# Patient Record
Sex: MALE
[De-identification: names, ages, dates, MRNs, and addresses within clinical notes are randomized per-mention and may not be internally consistent; named-entity substitution may affect disease eponyms.]

---

## 2023-07-05 ENCOUNTER — APPOINTMENT (OUTPATIENT)
Dept: PODIATRY | Facility: CLINIC | Age: 63
End: 2023-07-05
Payer: COMMERCIAL

## 2023-07-05 DIAGNOSIS — M76.71 PERONEAL TENDINITIS, RIGHT LEG: ICD-10-CM

## 2023-07-05 DIAGNOSIS — Z83.3 FAMILY HISTORY OF DIABETES MELLITUS: ICD-10-CM

## 2023-07-05 DIAGNOSIS — M79.671 PAIN IN RIGHT FOOT: ICD-10-CM

## 2023-07-05 PROCEDURE — 73630 X-RAY EXAM OF FOOT: CPT | Mod: RT

## 2023-07-05 PROCEDURE — 99203 OFFICE O/P NEW LOW 30 MIN: CPT | Mod: 25

## 2023-07-05 RX ORDER — MELOXICAM 15 MG/1
15 TABLET ORAL DAILY
Qty: 14 | Refills: 0 | Status: ACTIVE | COMMUNITY
Start: 2023-07-05 | End: 1900-01-01

## 2023-07-07 PROBLEM — M79.671 RIGHT FOOT PAIN: Status: ACTIVE | Noted: 2023-07-06

## 2023-07-07 PROBLEM — M76.71 PERONEAL TENDINITIS OF RIGHT LOWER EXTREMITY: Status: ACTIVE | Noted: 2023-07-06

## 2023-07-07 NOTE — HISTORY OF PRESENT ILLNESS
[Sneakers] : jaimie [FreeTextEntry1] : Patient presents today because of pain that has been going on for about a month at the outside of his right foot. He is very active playing golf. He did not twist it or traumatize it. It has been painful and unchanged. It is about a 4 or 5/10.

## 2023-07-07 NOTE — PHYSICAL EXAM
[2+] : left foot dorsalis pedis 2+ [Sensation] : the sensory exam was normal to light touch and pinprick [No Focal Deficits] : no focal deficits [Deep Tendon Reflexes (DTR)] : deep tendon reflexes were 2+ and symmetric [Motor Exam] : the motor exam was normal [Ankle Swelling (On Exam)] : not present [Varicose Veins Of Lower Extremities] : not present [Delayed in the Right Toes] : capillary refills normal in right toes [Delayed in the Left Toes] : capillary refills normal in the left toes [FreeTextEntry3] : Hair growth noted on digits. Proximal to distal cooling is within normal limits.  [de-identified] : There is no pain with resisted eversion but there is pain at the peroneus brevis tendon at the insertion at the base of the 5th metatarsal. The peroneus brevis tendon works well and is not painful with resisted eversion.  [FreeTextEntry1] : There is very minimal swelling at the base of the 5th metatarsal. No bruising.

## 2023-07-07 NOTE — PROCEDURE
[FreeTextEntry1] : \par X-rays were taken to evaluate for fracture or arthrosis.\par X-ray Report: (Right foot - 3 views) X-rays demonstrate no sign of bony irregularity, fracture or arthrosis appreciated.

## 2023-07-07 NOTE — ASSESSMENT
[FreeTextEntry1] : \par Impression: Peroneus brevis tendonitis. Pain as a result of equinus and a tight posterior muscle group.\par \par Treatment: I want him to rest and ice. He will ice for 10 minutes twice a day.  I gave him a series of stretching exercises. I put him on Meloxicam. With continued pain I discussed physical therapy and further work-up. Call the office with any issues that persist after 3 weeks.